# Patient Record
Sex: MALE | Race: WHITE | Employment: FULL TIME | URBAN - METROPOLITAN AREA
[De-identification: names, ages, dates, MRNs, and addresses within clinical notes are randomized per-mention and may not be internally consistent; named-entity substitution may affect disease eponyms.]

---
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
## 2022-05-16 NOTE — PROGRESS NOTES
Impression: Presbyopia: H52.4. Plan: Patient educated on myopic shift due OS  to cataract. New glasses Rx was not given today. (onychocryptosis)     3. Pain in toe of left foot           Plan: 1. Clinical evaluation of the patient. 2. The left hallux was dressed with antibiotic ointment and a band aid. Patient instructed to change this dressing daily until the wound heals. 3. Return if symptoms worsen or fail to improve.    12/9/2021      Mike Dobbs DPM

## 2024-10-11 ENCOUNTER — OFFICE VISIT (OUTPATIENT)
Dept: PODIATRY | Age: 24
End: 2024-10-11
Payer: COMMERCIAL

## 2024-10-11 VITALS — HEIGHT: 66 IN | BODY MASS INDEX: 26.52 KG/M2 | WEIGHT: 165 LBS

## 2024-10-11 DIAGNOSIS — L60.0 ONYCHOCRYPTOSIS: Primary | ICD-10-CM

## 2024-10-11 PROCEDURE — 99213 OFFICE O/P EST LOW 20 MIN: CPT | Performed by: PODIATRIST

## 2024-10-11 ASSESSMENT — ENCOUNTER SYMPTOMS
SHORTNESS OF BREATH: 0
NAUSEA: 0
DIARRHEA: 0
BACK PAIN: 0
COLOR CHANGE: 0

## 2024-10-11 NOTE — PROGRESS NOTES
[facility]  Return Patient Progress Note    Subjective: Cody J Behlmer 23 y.o. male that presents with chief complaint of ingrown nail to the left big toe.  Chief Complaint   Patient presents with    Ingrown Toenail     Left hallux     Patient states that this has been present for about 1 month.  Patient states that his symptoms have improved over the last few days.  Pain is rated 0 out of 10 and is described as none. Patient denies any treatment for this prior to today.    Review of Systems   Constitutional:  Negative for activity change, appetite change, chills, diaphoresis, fatigue and fever.   Respiratory:  Negative for shortness of breath.    Cardiovascular:  Negative for leg swelling.   Gastrointestinal:  Negative for diarrhea and nausea.   Endocrine: Negative for cold intolerance, heat intolerance and polyuria.   Musculoskeletal:  Negative for arthralgias, back pain, gait problem, joint swelling and myalgias.   Skin:  Negative for color change, pallor, rash and wound.   Allergic/Immunologic: Negative for environmental allergies and food allergies.   Neurological:  Negative for dizziness, weakness, light-headedness and numbness.   Hematological:  Does not bruise/bleed easily.   Psychiatric/Behavioral:  Negative for behavioral problems, confusion and self-injury. The patient is not nervous/anxious.        Objective: Clinical evaluation of the patient reveals mild evidence of a resolved ingrown nail to the medial aspect of the left hallux toenail.  No actual ingrowth is present to this nail today.  There is some dry scaly skin indicative of resolved edema to the medial border of the left hallux.  There is no erythema, calor, drainage, or malodor noted.  There is no pain with palpation or manipulation to this area of the right hallux.    Assessment:    Diagnosis Orders   1. Onychocryptosis              Plan: 1. Clinical evaluation of the patient. 2.  Patient informed the no ingrown nail is noted to